# Patient Record
Sex: MALE | Race: BLACK OR AFRICAN AMERICAN | NOT HISPANIC OR LATINO
[De-identification: names, ages, dates, MRNs, and addresses within clinical notes are randomized per-mention and may not be internally consistent; named-entity substitution may affect disease eponyms.]

---

## 2017-03-30 ENCOUNTER — APPOINTMENT (OUTPATIENT)
Dept: INTERNAL MEDICINE | Facility: CLINIC | Age: 52
End: 2017-03-30

## 2017-03-30 VITALS
BODY MASS INDEX: 29.31 KG/M2 | OXYGEN SATURATION: 99 % | DIASTOLIC BLOOD PRESSURE: 78 MMHG | SYSTOLIC BLOOD PRESSURE: 132 MMHG | WEIGHT: 214 LBS | HEART RATE: 72 BPM | TEMPERATURE: 97.9 F | HEIGHT: 71.5 IN

## 2017-03-30 DIAGNOSIS — Z82.49 FAMILY HISTORY OF ISCHEMIC HEART DISEASE AND OTHER DISEASES OF THE CIRCULATORY SYSTEM: ICD-10-CM

## 2017-03-30 DIAGNOSIS — F15.90 OTHER STIMULANT USE, UNSPECIFIED, UNCOMPLICATED: ICD-10-CM

## 2017-03-31 ENCOUNTER — MED ADMIN CHARGE (OUTPATIENT)
Age: 52
End: 2017-03-31

## 2017-04-03 LAB
25(OH)D3 SERPL-MCNC: 15.8 NG/ML
ALBUMIN SERPL ELPH-MCNC: 4.5 G/DL
ALP BLD-CCNC: 67 U/L
ALT SERPL-CCNC: 25 U/L
ANION GAP SERPL CALC-SCNC: 16 MMOL/L
AST SERPL-CCNC: 24 U/L
BASOPHILS # BLD AUTO: 0.01 K/UL
BASOPHILS NFR BLD AUTO: 0.2 %
BILIRUB SERPL-MCNC: 0.9 MG/DL
BUN SERPL-MCNC: 14 MG/DL
CALCIUM SERPL-MCNC: 9.3 MG/DL
CHLORIDE SERPL-SCNC: 101 MMOL/L
CHOLEST SERPL-MCNC: 215 MG/DL
CHOLEST/HDLC SERPL: 5.4 RATIO
CO2 SERPL-SCNC: 26 MMOL/L
CREAT SERPL-MCNC: 1.14 MG/DL
EOSINOPHIL # BLD AUTO: 0.13 K/UL
EOSINOPHIL NFR BLD AUTO: 2.9 %
GLUCOSE SERPL-MCNC: 78 MG/DL
HBA1C MFR BLD HPLC: 5.6 %
HCT VFR BLD CALC: 43.4 %
HDLC SERPL-MCNC: 40 MG/DL
HGB BLD-MCNC: 14.1 G/DL
IMM GRANULOCYTES NFR BLD AUTO: 0.2 %
LDLC SERPL CALC-MCNC: 148 MG/DL
LYMPHOCYTES # BLD AUTO: 1.17 K/UL
LYMPHOCYTES NFR BLD AUTO: 26.3 %
MAN DIFF?: NORMAL
MCHC RBC-ENTMCNC: 28.8 PG
MCHC RBC-ENTMCNC: 32.5 GM/DL
MCV RBC AUTO: 88.6 FL
MONOCYTES # BLD AUTO: 0.56 K/UL
MONOCYTES NFR BLD AUTO: 12.6 %
NEUTROPHILS # BLD AUTO: 2.57 K/UL
NEUTROPHILS NFR BLD AUTO: 57.8 %
PLATELET # BLD AUTO: 155 K/UL
POTASSIUM SERPL-SCNC: 4.4 MMOL/L
PROT SERPL-MCNC: 7.3 G/DL
PSA FREE SERPL-MCNC: 0.22 NG/ML
RBC # BLD: 4.9 M/UL
RBC # FLD: 13.8 %
SODIUM SERPL-SCNC: 143 MMOL/L
TRIGL SERPL-MCNC: 136 MG/DL
TSH SERPL-ACNC: 2.41 UIU/ML
WBC # FLD AUTO: 4.45 K/UL

## 2018-01-19 ENCOUNTER — APPOINTMENT (OUTPATIENT)
Dept: HEART AND VASCULAR | Facility: CLINIC | Age: 53
End: 2018-01-19
Payer: COMMERCIAL

## 2018-01-19 VITALS
RESPIRATION RATE: 12 BRPM | HEIGHT: 74 IN | BODY MASS INDEX: 25.54 KG/M2 | SYSTOLIC BLOOD PRESSURE: 118 MMHG | HEART RATE: 70 BPM | WEIGHT: 199 LBS | DIASTOLIC BLOOD PRESSURE: 80 MMHG | TEMPERATURE: 97.8 F | OXYGEN SATURATION: 96 %

## 2018-01-19 PROCEDURE — 99386 PREV VISIT NEW AGE 40-64: CPT | Mod: 25

## 2018-01-19 PROCEDURE — 36415 COLL VENOUS BLD VENIPUNCTURE: CPT

## 2018-01-19 PROCEDURE — 93000 ELECTROCARDIOGRAM COMPLETE: CPT

## 2018-01-19 RX ORDER — MULTIVIT-MIN/IRON FUM/FOLIC AC 7.5 MG-4
TABLET ORAL
Refills: 0 | Status: DISCONTINUED | COMMUNITY
End: 2018-01-19

## 2018-01-19 RX ORDER — NAPROXEN 500 MG/1
500 TABLET ORAL
Qty: 10 | Refills: 0 | Status: DISCONTINUED | COMMUNITY
Start: 2017-11-06 | End: 2018-01-19

## 2018-01-19 RX ORDER — MELOXICAM 15 MG/1
15 TABLET ORAL
Qty: 30 | Refills: 0 | Status: DISCONTINUED | COMMUNITY
Start: 2017-11-21 | End: 2018-01-19

## 2018-01-19 RX ORDER — ATOVAQUONE AND PROGUANIL HYDROCHLORIDE 250; 100 MG/1; MG/1
250-100 TABLET, FILM COATED ORAL
Qty: 30 | Refills: 0 | Status: DISCONTINUED | COMMUNITY
Start: 2017-07-26 | End: 2018-01-19

## 2018-01-22 LAB
ALBUMIN SERPL ELPH-MCNC: 4.6 G/DL
ALP BLD-CCNC: 51 U/L
ALT SERPL-CCNC: 16 U/L
ANION GAP SERPL CALC-SCNC: 10 MMOL/L
APPEARANCE: CLEAR
AST SERPL-CCNC: 26 U/L
BACTERIA: NEGATIVE
BASOPHILS # BLD AUTO: 0.01 K/UL
BASOPHILS NFR BLD AUTO: 0.2 %
BILIRUB SERPL-MCNC: 1 MG/DL
BILIRUBIN URINE: NEGATIVE
BLOOD URINE: NEGATIVE
BUN SERPL-MCNC: 10 MG/DL
CALCIUM SERPL-MCNC: 9.4 MG/DL
CHLORIDE SERPL-SCNC: 104 MMOL/L
CHOLEST SERPL-MCNC: 191 MG/DL
CHOLEST/HDLC SERPL: 4.3 RATIO
CO2 SERPL-SCNC: 31 MMOL/L
COLOR: YELLOW
CREAT SERPL-MCNC: 1.23 MG/DL
EOSINOPHIL # BLD AUTO: 0.19 K/UL
EOSINOPHIL NFR BLD AUTO: 4 %
GLUCOSE QUALITATIVE U: NEGATIVE MG/DL
GLUCOSE SERPL-MCNC: 86 MG/DL
HBA1C MFR BLD HPLC: 5.4 %
HCT VFR BLD CALC: 43 %
HDLC SERPL-MCNC: 44 MG/DL
HGB BLD-MCNC: 14.2 G/DL
HYALINE CASTS: 0 /LPF
IMM GRANULOCYTES NFR BLD AUTO: 0 %
KETONES URINE: NEGATIVE
LDLC SERPL CALC-MCNC: 127 MG/DL
LEUKOCYTE ESTERASE URINE: NEGATIVE
LYMPHOCYTES # BLD AUTO: 1.53 K/UL
LYMPHOCYTES NFR BLD AUTO: 32.1 %
MAN DIFF?: NORMAL
MCHC RBC-ENTMCNC: 29.2 PG
MCHC RBC-ENTMCNC: 33 GM/DL
MCV RBC AUTO: 88.3 FL
MICROSCOPIC-UA: NORMAL
MONOCYTES # BLD AUTO: 0.59 K/UL
MONOCYTES NFR BLD AUTO: 12.4 %
NEUTROPHILS # BLD AUTO: 2.44 K/UL
NEUTROPHILS NFR BLD AUTO: 51.3 %
NITRITE URINE: NEGATIVE
PH URINE: 7.5
PLATELET # BLD AUTO: 175 K/UL
POTASSIUM SERPL-SCNC: 5.2 MMOL/L
PROT SERPL-MCNC: 7.3 G/DL
PROTEIN URINE: NEGATIVE MG/DL
PSA SERPL-MCNC: 0.34 NG/ML
RBC # BLD: 4.87 M/UL
RBC # FLD: 13.5 %
RED BLOOD CELLS URINE: 2 /HPF
SODIUM SERPL-SCNC: 145 MMOL/L
SPECIFIC GRAVITY URINE: 1.01
SQUAMOUS EPITHELIAL CELLS: 0 /HPF
TRIGL SERPL-MCNC: 100 MG/DL
TSH SERPL-ACNC: 1.56 UIU/ML
UROBILINOGEN URINE: NEGATIVE MG/DL
WBC # FLD AUTO: 4.76 K/UL
WHITE BLOOD CELLS URINE: 0 /HPF

## 2018-11-21 ENCOUNTER — OUTPATIENT (OUTPATIENT)
Dept: OUTPATIENT SERVICES | Facility: HOSPITAL | Age: 53
LOS: 1 days | End: 2018-11-21
Payer: COMMERCIAL

## 2018-11-21 DIAGNOSIS — Z98.89 OTHER SPECIFIED POSTPROCEDURAL STATES: Chronic | ICD-10-CM

## 2018-11-21 PROCEDURE — 73521 X-RAY EXAM HIPS BI 2 VIEWS: CPT

## 2018-11-21 PROCEDURE — 73630 X-RAY EXAM OF FOOT: CPT | Mod: 26,LT

## 2018-11-21 PROCEDURE — 73630 X-RAY EXAM OF FOOT: CPT

## 2018-11-21 PROCEDURE — 73521 X-RAY EXAM HIPS BI 2 VIEWS: CPT | Mod: 26

## 2019-03-14 ENCOUNTER — NON-APPOINTMENT (OUTPATIENT)
Age: 54
End: 2019-03-14

## 2019-03-14 ENCOUNTER — APPOINTMENT (OUTPATIENT)
Dept: HEART AND VASCULAR | Facility: CLINIC | Age: 54
End: 2019-03-14
Payer: COMMERCIAL

## 2019-03-14 VITALS — HEIGHT: 74 IN | WEIGHT: 198 LBS | BODY MASS INDEX: 25.41 KG/M2

## 2019-03-14 PROCEDURE — 99214 OFFICE O/P EST MOD 30 MIN: CPT

## 2019-03-14 PROCEDURE — 93000 ELECTROCARDIOGRAM COMPLETE: CPT

## 2019-03-14 PROCEDURE — 99204 OFFICE O/P NEW MOD 45 MIN: CPT

## 2019-03-15 VITALS — DIASTOLIC BLOOD PRESSURE: 84 MMHG | SYSTOLIC BLOOD PRESSURE: 126 MMHG | RESPIRATION RATE: 16 BRPM | HEART RATE: 72 BPM

## 2019-03-15 NOTE — PHYSICAL EXAM
[FreeTextEntry1] : HEENT: normal. No thyromegaly or lymphadenopathy. Neck exam reveals no JVD or bruits.  Carotid pulses are present and symetric.  Heart exam: S1, S2 regular without murmur, gallop, or rub.  The lungs are clear without evidence of wheezes, rhonchi or rales.  Abdomen: Positive bowel sounds, no rebound tenderness. Extremities reveal no clubbing, cyanosis or edema. Neurologic: Grossly nonfocal.

## 2019-03-15 NOTE — DISCUSSION/SUMMARY
[FreeTextEntry1] : EKG: Normal sinus rhythm, normal EKG. \par \par Impression: Atypical chest pain. No further cardiac workup or followup indicated at this time.

## 2019-03-15 NOTE — REASON FOR VISIT
[Initial Evaluation] : an initial evaluation of [Chest Pain] : chest pain [FreeTextEntry1] : This 53-year-old athletic male comes in complaining of sharp chest pain. He states his chest pain lasts for seconds only and is not associated with exertion. He works out in the gym on a daily basis without any symptoms. He denies chest shortness of breath, palpitations or syncope. He does lift heavy weights.

## 2019-10-02 ENCOUNTER — APPOINTMENT (OUTPATIENT)
Dept: ORTHOPEDIC SURGERY | Facility: CLINIC | Age: 54
End: 2019-10-02
Payer: COMMERCIAL

## 2019-10-02 VITALS — DIASTOLIC BLOOD PRESSURE: 100 MMHG | SYSTOLIC BLOOD PRESSURE: 138 MMHG

## 2019-10-02 VITALS — HEIGHT: 74 IN | BODY MASS INDEX: 26.18 KG/M2 | WEIGHT: 204 LBS

## 2019-10-02 PROCEDURE — 99203 OFFICE O/P NEW LOW 30 MIN: CPT

## 2019-10-02 NOTE — ASSESSMENT
[FreeTextEntry1] : 54 year old male with left hallux valgus.  We discussed treatment options with him.  He is very eager to be evaluated for surgical intervention to correct his deformity and symptoms. We provided him with a referral for orthotics. We also provided him with a referral for surgical consultation by a foot and ankle surgeon.  He knows he can call with any questions or concerns.

## 2019-10-02 NOTE — HISTORY OF PRESENT ILLNESS
[de-identified] : 54 year old male presents with complaints of a left foot hallux valgus.  He says it has been symptomatic for some time.  He wears dress shoes with a wide toe but it continues to be painful.  He also says the deformity has progressed over time.  He wants to discuss treatment options.

## 2019-10-02 NOTE — PHYSICAL EXAM
[de-identified] : Left Lower Extremity:\par visible hallux valgus deformity\par loss of the medial arch of the foot\par able to do heel rise\par tender to palpation over bunion. \par full extension and flexion at 1st MTP\par SILT medial/lateral/1st DWS\par +motor ankle dorsiflexion, plantarflexion, EHL and flexion and extension of toes\par WWP distally, palpable PT and DP pulses.   [de-identified] : Left foot radiographs show hallux valgus deformity

## 2021-03-29 ENCOUNTER — APPOINTMENT (OUTPATIENT)
Age: 56
End: 2021-03-29

## 2021-06-07 ENCOUNTER — TRANSCRIPTION ENCOUNTER (OUTPATIENT)
Age: 56
End: 2021-06-07

## 2021-06-08 ENCOUNTER — OUTPATIENT (OUTPATIENT)
Dept: OUTPATIENT SERVICES | Facility: HOSPITAL | Age: 56
LOS: 1 days | Discharge: HOME | End: 2021-06-08
Payer: COMMERCIAL

## 2021-06-08 ENCOUNTER — RESULT REVIEW (OUTPATIENT)
Age: 56
End: 2021-06-08

## 2021-06-08 DIAGNOSIS — Z98.89 OTHER SPECIFIED POSTPROCEDURAL STATES: Chronic | ICD-10-CM

## 2021-06-08 LAB — SARS-COV-2 RNA SPEC QL NAA+PROBE: NEGATIVE — SIGNIFICANT CHANGE UP

## 2021-06-08 PROCEDURE — 88305 TISSUE EXAM BY PATHOLOGIST: CPT | Mod: 26

## 2021-06-08 PROCEDURE — 88304 TISSUE EXAM BY PATHOLOGIST: CPT | Mod: 26

## 2022-04-29 ENCOUNTER — NON-APPOINTMENT (OUTPATIENT)
Age: 57
End: 2022-04-29

## 2022-04-29 ENCOUNTER — APPOINTMENT (OUTPATIENT)
Dept: UROLOGY | Facility: CLINIC | Age: 57
End: 2022-04-29
Payer: SELF-PAY

## 2022-04-29 ENCOUNTER — RESULT CHARGE (OUTPATIENT)
Age: 57
End: 2022-04-29

## 2022-04-29 DIAGNOSIS — K64.4 RESIDUAL HEMORRHOIDAL SKIN TAGS: ICD-10-CM

## 2022-04-29 DIAGNOSIS — M20.12 HALLUX VALGUS (ACQUIRED), LEFT FOOT: ICD-10-CM

## 2022-04-29 PROCEDURE — 99204 OFFICE O/P NEW MOD 45 MIN: CPT

## 2022-04-29 PROCEDURE — 51798 US URINE CAPACITY MEASURE: CPT

## 2022-04-29 NOTE — ADDENDUM
[FreeTextEntry1] : A portion of this note was written by [Norman Jacobo] on 04/29/2022 acting as a scribe for Dr. Us. \par \par I have personally reviewed the chart and agree that the record accurately reflects my personal performance of the history, physical exam, assessment, and plan.

## 2022-04-29 NOTE — HISTORY OF PRESENT ILLNESS
[FreeTextEntry1] : Mr. JASWANT CABEZAS comes in today for a urologic evaluation. He is here at the request of his wife for a routine evaluation. \par \par From his general urologic history, Mr. CABEZAS reports mild stable lower urinary tract symptoms, with nocturia x 1. \par IPSS: 5/35\par Sono (performed to assess bladder emptying): Nil PVR; 32cc prostate\par \par His erectile function is reportedly normal. \par \par PSAs: 1/22/18--0.34;

## 2022-04-29 NOTE — PHYSICAL EXAM
[General Appearance - Well Developed] : well developed [General Appearance - Well Nourished] : well nourished [Normal Appearance] : normal appearance [Well Groomed] : well groomed [General Appearance - In No Acute Distress] : no acute distress [Edema] : no peripheral edema [Respiration, Rhythm And Depth] : normal respiratory rhythm and effort [Exaggerated Use Of Accessory Muscles For Inspiration] : no accessory muscle use [Abdomen Soft] : soft [Abdomen Tenderness] : non-tender [Costovertebral Angle Tenderness] : no ~M costovertebral angle tenderness [Urethral Meatus] : meatus normal [Urinary Bladder Findings] : the bladder was normal on palpation [Scrotum] : the scrotum was normal [Testes Mass (___cm)] : there were no testicular masses [No Prostate Nodules] : no prostate nodules [Normal Station and Gait] : the gait and station were normal for the patient's age [] : no rash [No Focal Deficits] : no focal deficits [Oriented To Time, Place, And Person] : oriented to person, place, and time [Affect] : the affect was normal [Mood] : the mood was normal [Not Anxious] : not anxious [No Palpable Adenopathy] : no palpable adenopathy [Heart Rate And Rhythm] : Heart rate and rhythm were normal [Abdomen Mass (___ Cm)] : no abdominal mass palpated [Penis Abnormality] : normal circumcised penis [Epididymis] : the epididymides were normal [Testes Tenderness] : no tenderness of the testes [Prostate Tenderness] : the prostate was not tender [Skin Color & Pigmentation] : normal skin color and pigmentation [FreeTextEntry1] : Umbilical hernia (reducible)

## 2022-04-29 NOTE — LETTER BODY
[Dear  ___] : Dear  [unfilled], [Consult Letter:] : I had the pleasure of evaluating your patient, [unfilled]. [Please see my note below.] : Please see my note below. [Consult Closing:] : Thank you very much for allowing me to participate in the care of this patient.  If you have any questions, please do not hesitate to contact me. [Sincerely,] : Sincerely, [FreeTextEntry3] : Cedrick Us MD, FACS

## 2022-04-30 LAB
BILIRUB UR QL STRIP: NORMAL
CLARITY UR: CLEAR
COLLECTION METHOD: NORMAL
GLUCOSE UR-MCNC: NORMAL
HCG UR QL: 0.2 EU/DL
HGB UR QL STRIP.AUTO: NORMAL
KETONES UR-MCNC: NORMAL
LEUKOCYTE ESTERASE UR QL STRIP: NORMAL
NITRITE UR QL STRIP: NORMAL
PH UR STRIP: 7
PROT UR STRIP-MCNC: NORMAL
PSA SERPL-MCNC: 0.35 NG/ML
SP GR UR STRIP: 1.01

## 2022-05-02 ENCOUNTER — NON-APPOINTMENT (OUTPATIENT)
Age: 57
End: 2022-05-02

## 2022-05-16 ENCOUNTER — NON-APPOINTMENT (OUTPATIENT)
Age: 57
End: 2022-05-16

## 2022-05-16 ENCOUNTER — APPOINTMENT (OUTPATIENT)
Dept: INTERNAL MEDICINE | Facility: CLINIC | Age: 57
End: 2022-05-16
Payer: COMMERCIAL

## 2022-05-16 VITALS
OXYGEN SATURATION: 99 % | TEMPERATURE: 97.1 F | HEART RATE: 74 BPM | SYSTOLIC BLOOD PRESSURE: 138 MMHG | BODY MASS INDEX: 26.18 KG/M2 | WEIGHT: 204 LBS | DIASTOLIC BLOOD PRESSURE: 88 MMHG | HEIGHT: 74 IN

## 2022-05-16 DIAGNOSIS — Z87.898 PERSONAL HISTORY OF OTHER SPECIFIED CONDITIONS: ICD-10-CM

## 2022-05-16 DIAGNOSIS — Z00.00 ENCOUNTER FOR GENERAL ADULT MEDICAL EXAMINATION W/OUT ABNORMAL FINDINGS: ICD-10-CM

## 2022-05-16 DIAGNOSIS — R39.9 UNSPECIFIED SYMPTOMS AND SIGNS INVOLVING THE GENITOURINARY SYSTEM: ICD-10-CM

## 2022-05-16 PROCEDURE — 99386 PREV VISIT NEW AGE 40-64: CPT | Mod: 25

## 2022-05-16 PROCEDURE — 93000 ELECTROCARDIOGRAM COMPLETE: CPT

## 2022-05-16 PROCEDURE — 36415 COLL VENOUS BLD VENIPUNCTURE: CPT

## 2022-05-16 NOTE — HISTORY OF PRESENT ILLNESS
[de-identified] : No nausea, vomiting, diarrhea, and constipation. No chest pain or shortness of breath.\par Push ups,sit ups, stationary bike, walks.  Daily exercise.  Weight stable.\par \par Neck discomfort with extension with occasional HA.  No h/o trauma

## 2022-05-16 NOTE — HEALTH RISK ASSESSMENT
[Very Good] : ~his/her~  mood as very good [Never] : Never [0] : 2) Feeling down, depressed, or hopeless: Not at all (0) [Patient reported colonoscopy was normal] : Patient reported colonoscopy was normal [None] : None [With Family] : lives with family [Employed] : employed [] :  [# Of Children ___] : has [unfilled] children [[id number]] : 0 [ColonoscopyDate] : 03/22

## 2022-05-27 LAB
ALBUMIN SERPL ELPH-MCNC: 4.6 G/DL
ALP BLD-CCNC: 56 U/L
ALT SERPL-CCNC: 25 U/L
ANION GAP SERPL CALC-SCNC: 8 MMOL/L
APPEARANCE: CLEAR
AST SERPL-CCNC: 53 U/L
BASOPHILS # BLD AUTO: 0.02 K/UL
BASOPHILS NFR BLD AUTO: 0.5 %
BILIRUB SERPL-MCNC: 0.8 MG/DL
BILIRUBIN URINE: NEGATIVE
BLOOD URINE: NEGATIVE
BUN SERPL-MCNC: 7 MG/DL
CALCIUM SERPL-MCNC: 9.3 MG/DL
CHLORIDE SERPL-SCNC: 103 MMOL/L
CHOLEST SERPL-MCNC: 208 MG/DL
CO2 SERPL-SCNC: 30 MMOL/L
COLOR: COLORLESS
CREAT SERPL-MCNC: 1.24 MG/DL
EGFR: 69 ML/MIN/1.73M2
EOSINOPHIL # BLD AUTO: 0.19 K/UL
EOSINOPHIL NFR BLD AUTO: 4.4 %
FOLATE SERPL-MCNC: 15.9 NG/ML
GLUCOSE QUALITATIVE U: NEGATIVE
GLUCOSE SERPL-MCNC: 90 MG/DL
HCT VFR BLD CALC: 41.9 %
HDLC SERPL-MCNC: 48 MG/DL
HGB BLD-MCNC: 13.8 G/DL
IMM GRANULOCYTES NFR BLD AUTO: 0.2 %
KETONES URINE: NEGATIVE
LDLC SERPL CALC-MCNC: 135 MG/DL
LEUKOCYTE ESTERASE URINE: NEGATIVE
LYMPHOCYTES # BLD AUTO: 1.54 K/UL
LYMPHOCYTES NFR BLD AUTO: 35.7 %
MAN DIFF?: NORMAL
MCHC RBC-ENTMCNC: 29.2 PG
MCHC RBC-ENTMCNC: 32.9 GM/DL
MCV RBC AUTO: 88.6 FL
MONOCYTES # BLD AUTO: 0.53 K/UL
MONOCYTES NFR BLD AUTO: 12.3 %
NEUTROPHILS # BLD AUTO: 2.02 K/UL
NEUTROPHILS NFR BLD AUTO: 46.9 %
NITRITE URINE: NEGATIVE
NONHDLC SERPL-MCNC: 160 MG/DL
PH URINE: 7
PLATELET # BLD AUTO: 149 K/UL
POTASSIUM SERPL-SCNC: 4.2 MMOL/L
PROT SERPL-MCNC: 6.8 G/DL
PROTEIN URINE: NEGATIVE
RBC # BLD: 4.73 M/UL
RBC # FLD: 13.1 %
SODIUM SERPL-SCNC: 141 MMOL/L
SPECIFIC GRAVITY URINE: 1
TRIGL SERPL-MCNC: 128 MG/DL
TSH SERPL-ACNC: 3.77 UIU/ML
UROBILINOGEN URINE: NORMAL
VIT B12 SERPL-MCNC: 232 PG/ML
WBC # FLD AUTO: 4.31 K/UL

## 2022-06-01 ENCOUNTER — APPOINTMENT (OUTPATIENT)
Dept: INTERNAL MEDICINE | Facility: CLINIC | Age: 57
End: 2022-06-01
Payer: COMMERCIAL

## 2022-06-01 ENCOUNTER — MED ADMIN CHARGE (OUTPATIENT)
Age: 57
End: 2022-06-01

## 2022-06-01 PROCEDURE — 96372 THER/PROPH/DIAG INJ SC/IM: CPT

## 2022-06-01 RX ORDER — CYANOCOBALAMIN 1000 UG/ML
1000 INJECTION INTRAMUSCULAR; SUBCUTANEOUS
Qty: 0 | Refills: 0 | Status: COMPLETED | OUTPATIENT
Start: 2022-06-01

## 2022-06-15 LAB — SURGICAL PATHOLOGY STUDY: SIGNIFICANT CHANGE UP

## 2022-07-06 ENCOUNTER — APPOINTMENT (OUTPATIENT)
Dept: INTERNAL MEDICINE | Facility: CLINIC | Age: 57
End: 2022-07-06

## 2022-07-06 VITALS
HEART RATE: 67 BPM | HEIGHT: 74 IN | DIASTOLIC BLOOD PRESSURE: 80 MMHG | BODY MASS INDEX: 25.54 KG/M2 | RESPIRATION RATE: 16 BRPM | WEIGHT: 199 LBS | OXYGEN SATURATION: 98 % | SYSTOLIC BLOOD PRESSURE: 134 MMHG

## 2022-07-06 PROCEDURE — 99214 OFFICE O/P EST MOD 30 MIN: CPT | Mod: 25

## 2022-07-06 PROCEDURE — 96372 THER/PROPH/DIAG INJ SC/IM: CPT

## 2022-07-06 RX ORDER — CYANOCOBALAMIN 1000 UG/ML
1000 INJECTION INTRAMUSCULAR; SUBCUTANEOUS
Qty: 0 | Refills: 0 | Status: COMPLETED | OUTPATIENT
Start: 2022-07-06

## 2022-07-06 RX ADMIN — CYANOCOBALAMIN 0 MCG/ML: 1000 INJECTION INTRAMUSCULAR; SUBCUTANEOUS at 00:00

## 2022-07-08 ENCOUNTER — RESULT REVIEW (OUTPATIENT)
Age: 57
End: 2022-07-08

## 2022-07-08 ENCOUNTER — OUTPATIENT (OUTPATIENT)
Dept: OUTPATIENT SERVICES | Facility: HOSPITAL | Age: 57
LOS: 1 days | End: 2022-07-08

## 2022-07-08 ENCOUNTER — APPOINTMENT (OUTPATIENT)
Dept: MRI IMAGING | Facility: CLINIC | Age: 57
End: 2022-07-08

## 2022-07-08 DIAGNOSIS — Z98.89 OTHER SPECIFIED POSTPROCEDURAL STATES: Chronic | ICD-10-CM

## 2022-07-08 PROCEDURE — 72141 MRI NECK SPINE W/O DYE: CPT | Mod: 26

## 2022-07-08 NOTE — HISTORY OF PRESENT ILLNESS
[FreeTextEntry1] : Neck pain. [de-identified] : c/o Chronic neck pain R side of neck.  Pt attributes to an allergy to a hair product months back activating nerves and also using three monitors at work with constant turning of head.  Gives a painful headache relieved with Excedrin.  No hx MVA.  No hx trauma.

## 2022-07-08 NOTE — PHYSICAL EXAM
[No Acute Distress] : no acute distress [Well Nourished] : well nourished [Normal Oropharynx] : the oropharynx was normal [Normal TMs] : both tympanic membranes were normal [No JVD] : no jugular venous distention [Supple] : supple [No Respiratory Distress] : no respiratory distress  [Clear to Auscultation] : lungs were clear to auscultation bilaterally [Regular Rhythm] : with a regular rhythm [No Murmur] : no murmur heard [Soft] : abdomen soft [Non Tender] : non-tender [No Rash] : no rash [Coordination Grossly Intact] : coordination grossly intact [Normal Gait] : normal gait [de-identified] : Patient has right-sided cervical muscle spasms with tenderness from the occiput down to the trapezius

## 2022-07-22 LAB
ALBUMIN SERPL ELPH-MCNC: 4.5 G/DL
ALP BLD-CCNC: 51 U/L
ALT SERPL-CCNC: 28 U/L
ANION GAP SERPL CALC-SCNC: 7 MMOL/L
AST SERPL-CCNC: 38 U/L
BILIRUB SERPL-MCNC: 0.5 MG/DL
BUN SERPL-MCNC: 11 MG/DL
CALCIUM SERPL-MCNC: 9.1 MG/DL
CHLORIDE SERPL-SCNC: 101 MMOL/L
CK SERPL-CCNC: 336 U/L
CO2 SERPL-SCNC: 30 MMOL/L
CREAT SERPL-MCNC: 1.39 MG/DL
EGFR: 60 ML/MIN/1.73M2
GGT SERPL-CCNC: 11 U/L
GLUCOSE SERPL-MCNC: 84 MG/DL
HAV IGM SER QL: NONREACTIVE
HBV CORE IGM SER QL: NONREACTIVE
HBV SURFACE AG SER QL: NONREACTIVE
HCV AB SER QL: NONREACTIVE
HCV S/CO RATIO: 0.14 S/CO
MAGNESIUM SERPL-MCNC: 2.2 MG/DL
PHOSPHATE SERPL-MCNC: 3.5 MG/DL
POTASSIUM SERPL-SCNC: 4.5 MMOL/L
PROT SERPL-MCNC: 6.8 G/DL
SODIUM SERPL-SCNC: 138 MMOL/L

## 2022-08-02 ENCOUNTER — APPOINTMENT (OUTPATIENT)
Dept: INTERNAL MEDICINE | Facility: CLINIC | Age: 57
End: 2022-08-02

## 2022-08-09 ENCOUNTER — APPOINTMENT (OUTPATIENT)
Dept: ORTHOPEDIC SURGERY | Facility: CLINIC | Age: 57
End: 2022-08-09

## 2022-08-09 PROCEDURE — 72050 X-RAY EXAM NECK SPINE 4/5VWS: CPT

## 2022-08-09 PROCEDURE — 99214 OFFICE O/P EST MOD 30 MIN: CPT

## 2022-08-11 NOTE — HISTORY OF PRESENT ILLNESS
[de-identified] : Initial 8/9/22: Mr. Madden reports a 1-2 year history of neck clicking sound and sensation when he turns his head. Patient states onset while working remotely due to COVID. The symptoms have progressed over the past year. He also reports nonradiating neck pain locally to the right side of his neck. He denies upper extremity numbness, weakness, or paresthesias. He does report paresthesias that radiate up the back of his head, worse on the right side, and occasionally radiate forward to his forehead. He has had 3 sessions of physical therapy over the past several weeks with mild improvement of his neck pain.

## 2022-08-11 NOTE — PHYSICAL EXAM
[de-identified] : General: patient is well developed, well nourished, in no acute \par distress, alert and oriented x 3. \par \par Mood and affect: normal\par \par Respiratory: no respiratory distress noted\par \par Skin: no scars over spine, skin intact, no erythema, increased warmth\par \par Alignment:The spine is well compensated in the coronal and sagittal plane. \par \par Gait: The patient is able to toe walk and heel walk without difficulty. \par \par Palpation: no tenderness to palpation cervical spine or paraspinal region\par \par Range of motion: Cervical spine ROM is full\par \par Neurologic Exam:\par Motor: Manual Muscle testing in the upper and lower extremities is 5 out of 5 in all muscle groups. There is no evidence of muscular atrophy in the upper extremities. Sensory: Sensation to light touch is grossly intact in the upper and lower extremities\par \par Reflexes: DTR are present and symmetric throughout, negative whitaker bilaterally, negative inverted radial reflex bilaterally, no clonus, plantar responses are flexor\par \par Special tests: Spurlings sign negative Lhermitte's sign is absent. .\par \par Vascular: Examination of the peripheral vascular system demonstrates no evidence of congestion or edema. no lymphedema bilateral lower extremities, pulses are present and symmetric in both lower extremities.  [de-identified] : XR AP/Lateral, flexion/extension 8/9/22 [my read]: demonstrate C4/5 mild disc degeneration with anterior osteophyte C5/6 and C6/7 with severe disc degeneration and height loss C7/T1 with anterior heterotopic ossification of the disc space. Flexion/extension does not reveal any instability, and there are no fractures seen. \par \par MRI Cervical Spine 7/8/22 [my read]: demonstrates again C4-T1 disc degeneration worse at C5/6 and C6/7. There is foraminal stenosis seen at C5/6 and C6/7. There is no significant neurocompression at the other levels. There is no cord compression. There is no signal change.

## 2022-08-11 NOTE — ADDENDUM
[FreeTextEntry1] : I, Louisa Vance, assisted with documentation on 08/09/2022 acting as scribe for Dr. Norman Vieyra.

## 2022-08-11 NOTE — DISCUSSION/SUMMARY
[de-identified] : Diagnosis: cervical degenerative disc disease multilevel, cervical foraminal stenosis C5-7, possible cervicogenic headaches/paresthesia. \par \par I have discussed my findings with the patient. His symptoms are well managed from a structural perspective, and he does not require any surgery at this time. He is concerned about the paresthesias that he feels on both sides of his head, and I have recommended that he see a neurologist for this condition for further consultation. He should see me on an as needed basis.

## 2022-08-30 ENCOUNTER — APPOINTMENT (OUTPATIENT)
Dept: HEART AND VASCULAR | Facility: CLINIC | Age: 57
End: 2022-08-30

## 2022-08-30 ENCOUNTER — NON-APPOINTMENT (OUTPATIENT)
Age: 57
End: 2022-08-30

## 2022-08-30 VITALS
DIASTOLIC BLOOD PRESSURE: 84 MMHG | HEART RATE: 50 BPM | WEIGHT: 192 LBS | TEMPERATURE: 98.6 F | BODY MASS INDEX: 24.64 KG/M2 | HEIGHT: 74 IN | SYSTOLIC BLOOD PRESSURE: 146 MMHG

## 2022-08-30 PROCEDURE — 99204 OFFICE O/P NEW MOD 45 MIN: CPT | Mod: 25

## 2022-08-30 PROCEDURE — 93306 TTE W/DOPPLER COMPLETE: CPT

## 2022-08-30 PROCEDURE — 93000 ELECTROCARDIOGRAM COMPLETE: CPT

## 2022-08-30 RX ORDER — AMLODIPINE BESYLATE 5 MG/1
5 TABLET ORAL
Qty: 90 | Refills: 3 | Status: DISCONTINUED | COMMUNITY
Start: 2022-08-30 | End: 2022-08-30

## 2022-09-20 ENCOUNTER — APPOINTMENT (OUTPATIENT)
Dept: HEART AND VASCULAR | Facility: CLINIC | Age: 57
End: 2022-09-20

## 2022-09-20 VITALS
DIASTOLIC BLOOD PRESSURE: 90 MMHG | TEMPERATURE: 97.4 F | HEART RATE: 64 BPM | WEIGHT: 195 LBS | SYSTOLIC BLOOD PRESSURE: 142 MMHG | BODY MASS INDEX: 25.04 KG/M2

## 2022-09-20 PROCEDURE — 93015 CV STRESS TEST SUPVJ I&R: CPT

## 2022-09-20 PROCEDURE — 99214 OFFICE O/P EST MOD 30 MIN: CPT | Mod: 25

## 2022-09-26 ENCOUNTER — OUTPATIENT (OUTPATIENT)
Dept: OUTPATIENT SERVICES | Facility: HOSPITAL | Age: 57
LOS: 1 days | End: 2022-09-26
Payer: SELF-PAY

## 2022-09-26 ENCOUNTER — APPOINTMENT (OUTPATIENT)
Dept: CT IMAGING | Facility: HOSPITAL | Age: 57
End: 2022-09-26

## 2022-09-26 DIAGNOSIS — Z98.89 OTHER SPECIFIED POSTPROCEDURAL STATES: Chronic | ICD-10-CM

## 2022-09-26 PROCEDURE — 75571 CT HRT W/O DYE W/CA TEST: CPT

## 2022-09-26 PROCEDURE — 75571 CT HRT W/O DYE W/CA TEST: CPT | Mod: 26

## 2022-09-28 ENCOUNTER — APPOINTMENT (OUTPATIENT)
Dept: NEUROLOGY | Facility: CLINIC | Age: 57
End: 2022-09-28

## 2022-09-28 ENCOUNTER — LABORATORY RESULT (OUTPATIENT)
Age: 57
End: 2022-09-28

## 2022-09-28 ENCOUNTER — NON-APPOINTMENT (OUTPATIENT)
Age: 57
End: 2022-09-28

## 2022-09-28 VITALS — DIASTOLIC BLOOD PRESSURE: 78 MMHG | HEART RATE: 80 BPM | OXYGEN SATURATION: 99 % | SYSTOLIC BLOOD PRESSURE: 118 MMHG

## 2022-09-28 VITALS
HEIGHT: 74 IN | SYSTOLIC BLOOD PRESSURE: 153 MMHG | BODY MASS INDEX: 24.26 KG/M2 | HEART RATE: 60 BPM | TEMPERATURE: 97.8 F | OXYGEN SATURATION: 96 % | DIASTOLIC BLOOD PRESSURE: 89 MMHG | WEIGHT: 189 LBS

## 2022-09-28 DIAGNOSIS — M54.81 OCCIPITAL NEURALGIA: ICD-10-CM

## 2022-09-28 PROCEDURE — 99204 OFFICE O/P NEW MOD 45 MIN: CPT

## 2022-09-28 RX ORDER — ATENOLOL 50 MG/1
50 TABLET ORAL
Qty: 180 | Refills: 0 | Status: DISCONTINUED | COMMUNITY
Start: 2022-01-10 | End: 2022-09-28

## 2022-09-28 NOTE — PHYSICAL EXAM
[FreeTextEntry1] : Motor: 5/5 strength throughout\par Sensory: vibration mildly reduced at toes b/l\par Reflexes: 2+ symmetric throughout \par Gait: normal, can tandem without difficulty \par MSK: tenderness of right upper trapezius and insertion of semispinalis capitis

## 2022-09-28 NOTE — HISTORY OF PRESENT ILLNESS
[FreeTextEntry1] : Referred by Dr. Rosa for neck pain and tingling in the back of the head, right worse than left, started about 2 months ago and has gotten better since then. He has been doing physical therapy which has helped. He relates onset of symptoms to using a monitor up and to the right of his head; he changed the set up and thinks this has helped. He was also using a hair dye which caused some itching and thinks it might be related to the numbness. \par He also complains of imbalance, which has also improved. \par \par Reviewed: \par Labs-  B12 232, folate 15.9, \par \par Personally reviewed and interpreted:\par MRI C spine - multilevel degenerative disease worst at C/5/6/7 without cord or nerve root compression

## 2022-09-28 NOTE — ASSESSMENT
[FreeTextEntry1] : Symptoms consistent with neck muscle spasm with occipital neuralgia, now improved with changes to ergonomics and PT\par Imbalance may be related to B12 deficiency although currently balance is normal and vibration sense only very mildly reduced at toes\par Return as needed

## 2022-09-28 NOTE — CONSULT LETTER
[Dear  ___] : Dear  [unfilled], [Consult Letter:] : I had the pleasure of evaluating your patient, [unfilled]. [Please see my note below.] : Please see my note below. [Consult Closing:] : Thank you very much for allowing me to participate in the care of this patient.  If you have any questions, please do not hesitate to contact me. [Sincerely,] : Sincerely, [FreeTextEntry3] : Harsh Seymour M.D.\par Neurology, Electromyography and Neuromuscular Medicine\par [facility]\par \par  of Neurology\par [facility] [employer] [facility] [DrAutumn  ___] : Dr. OROZCO

## 2022-10-04 ENCOUNTER — APPOINTMENT (OUTPATIENT)
Dept: HEART AND VASCULAR | Facility: CLINIC | Age: 57
End: 2022-10-04

## 2022-10-04 PROCEDURE — 99213 OFFICE O/P EST LOW 20 MIN: CPT | Mod: 95

## 2022-11-10 ENCOUNTER — APPOINTMENT (OUTPATIENT)
Dept: INTERNAL MEDICINE | Facility: CLINIC | Age: 57
End: 2022-11-10

## 2022-11-10 ENCOUNTER — MED ADMIN CHARGE (OUTPATIENT)
Age: 57
End: 2022-11-10

## 2022-11-10 PROCEDURE — 96372 THER/PROPH/DIAG INJ SC/IM: CPT

## 2022-11-10 RX ORDER — CYANOCOBALAMIN 1000 UG/ML
1000 INJECTION INTRAMUSCULAR; SUBCUTANEOUS
Qty: 0 | Refills: 0 | Status: COMPLETED | OUTPATIENT
Start: 2022-11-08

## 2022-11-14 ENCOUNTER — APPOINTMENT (OUTPATIENT)
Dept: HEART AND VASCULAR | Facility: CLINIC | Age: 57
End: 2022-11-14

## 2022-11-14 ENCOUNTER — NON-APPOINTMENT (OUTPATIENT)
Age: 57
End: 2022-11-14

## 2022-11-14 VITALS
HEART RATE: 56 BPM | BODY MASS INDEX: 24.13 KG/M2 | WEIGHT: 188 LBS | HEIGHT: 74 IN | TEMPERATURE: 98.6 F | DIASTOLIC BLOOD PRESSURE: 90 MMHG | SYSTOLIC BLOOD PRESSURE: 148 MMHG

## 2022-11-14 PROCEDURE — 99213 OFFICE O/P EST LOW 20 MIN: CPT | Mod: 25

## 2022-11-14 PROCEDURE — 93000 ELECTROCARDIOGRAM COMPLETE: CPT

## 2022-11-14 PROCEDURE — 36415 COLL VENOUS BLD VENIPUNCTURE: CPT

## 2022-11-15 LAB
CHOLEST SERPL-MCNC: 196 MG/DL
CRP SERPL HS-MCNC: <0.15 MG/L
HCYS SERPL-MCNC: 12.1 UMOL/L
HDLC SERPL-MCNC: 54 MG/DL
LDLC SERPL CALC-MCNC: 126 MG/DL
NONHDLC SERPL-MCNC: 142 MG/DL
TRIGL SERPL-MCNC: 82 MG/DL

## 2022-11-16 ENCOUNTER — APPOINTMENT (OUTPATIENT)
Dept: HEART AND VASCULAR | Facility: CLINIC | Age: 57
End: 2022-11-16

## 2022-11-16 LAB — APO LP(A) SERPL-MCNC: 59.7 NMOL/L

## 2022-11-16 PROCEDURE — 99443: CPT

## 2022-12-08 ENCOUNTER — APPOINTMENT (OUTPATIENT)
Dept: INTERNAL MEDICINE | Facility: CLINIC | Age: 57
End: 2022-12-08

## 2022-12-08 ENCOUNTER — MED ADMIN CHARGE (OUTPATIENT)
Age: 57
End: 2022-12-08

## 2022-12-08 PROCEDURE — 96372 THER/PROPH/DIAG INJ SC/IM: CPT

## 2022-12-08 RX ORDER — CYANOCOBALAMIN 1000 UG/ML
1000 INJECTION INTRAMUSCULAR; SUBCUTANEOUS
Qty: 0 | Refills: 0 | Status: COMPLETED | OUTPATIENT
Start: 2022-12-08

## 2022-12-08 RX ADMIN — CYANOCOBALAMIN 0 MCG/ML: 1000 INJECTION INTRAMUSCULAR; SUBCUTANEOUS at 00:00

## 2023-01-10 ENCOUNTER — APPOINTMENT (OUTPATIENT)
Dept: INTERNAL MEDICINE | Facility: CLINIC | Age: 58
End: 2023-01-10
Payer: COMMERCIAL

## 2023-01-10 ENCOUNTER — MED ADMIN CHARGE (OUTPATIENT)
Age: 58
End: 2023-01-10

## 2023-01-10 PROCEDURE — 96372 THER/PROPH/DIAG INJ SC/IM: CPT

## 2023-01-10 RX ORDER — CYANOCOBALAMIN 1000 UG/ML
1000 INJECTION INTRAMUSCULAR; SUBCUTANEOUS
Qty: 0 | Refills: 0 | Status: COMPLETED | OUTPATIENT
Start: 2023-01-10

## 2023-01-19 ENCOUNTER — APPOINTMENT (OUTPATIENT)
Dept: INTERNAL MEDICINE | Facility: CLINIC | Age: 58
End: 2023-01-19
Payer: COMMERCIAL

## 2023-01-19 ENCOUNTER — NON-APPOINTMENT (OUTPATIENT)
Age: 58
End: 2023-01-19

## 2023-01-19 DIAGNOSIS — M54.50 LOW BACK PAIN, UNSPECIFIED: ICD-10-CM

## 2023-01-19 PROCEDURE — 99213 OFFICE O/P EST LOW 20 MIN: CPT | Mod: 95

## 2023-01-19 RX ORDER — CYCLOBENZAPRINE HYDROCHLORIDE 10 MG/1
10 TABLET, FILM COATED ORAL TWICE DAILY
Qty: 20 | Refills: 1 | Status: ACTIVE | COMMUNITY
Start: 2023-01-19 | End: 1900-01-01

## 2023-01-19 RX ORDER — NAPROXEN 500 MG/1
500 TABLET ORAL
Qty: 20 | Refills: 0 | Status: ACTIVE | COMMUNITY
Start: 2023-01-19 | End: 1900-01-01

## 2023-01-21 NOTE — HISTORY OF PRESENT ILLNESS
[Home] : at home, [unfilled] , at the time of the visit. [Medical Office: ([facility])___] : at the medical office located in  [Verbal consent obtained from patient] : the patient, [unfilled] [de-identified] : Ran into wall playing [employer] 2 days and has pain in R lower back.  Having spasms and very bad if coughs.  No advil or heat.  Using Salon patch.  No nausea, vomiting, diarrhea, and constipation. No chest pain or shortness of breath.\par

## 2023-01-21 NOTE — REVIEW OF SYSTEMS
[Fever] : no fever [Night Sweats] : no night sweats [Earache] : no earache [Nasal Discharge] : no nasal discharge [Chest Pain] : no chest pain [Palpitations] : no palpitations [Shortness Of Breath] : no shortness of breath [Wheezing] : no wheezing [Nausea] : no nausea [Vomiting] : no vomiting [Easy Bleeding] : no easy bleeding [Easy Bruising] : no easy bruising

## 2023-01-30 RX ORDER — CYANOCOBALAMIN 1000 UG/ML
1000 INJECTION INTRAMUSCULAR; SUBCUTANEOUS
Qty: 0 | Refills: 0 | Status: COMPLETED | OUTPATIENT
Start: 2022-12-08

## 2023-02-17 ENCOUNTER — APPOINTMENT (OUTPATIENT)
Dept: INTERNAL MEDICINE | Facility: CLINIC | Age: 58
End: 2023-02-17
Payer: COMMERCIAL

## 2023-02-17 ENCOUNTER — MED ADMIN CHARGE (OUTPATIENT)
Age: 58
End: 2023-02-17

## 2023-02-17 DIAGNOSIS — E53.8 DEFICIENCY OF OTHER SPECIFIED B GROUP VITAMINS: ICD-10-CM

## 2023-02-17 PROCEDURE — 96372 THER/PROPH/DIAG INJ SC/IM: CPT

## 2023-02-17 RX ORDER — CYANOCOBALAMIN 1000 UG/ML
1000 INJECTION INTRAMUSCULAR; SUBCUTANEOUS
Qty: 0 | Refills: 0 | Status: COMPLETED | OUTPATIENT
Start: 2023-02-17

## 2023-02-17 RX ADMIN — CYANOCOBALAMIN 0 MCG/ML: 1000 INJECTION, SOLUTION INTRAMUSCULAR at 00:00

## 2023-02-22 PROBLEM — E53.8 VITAMIN B12 DEFICIENCY: Status: ACTIVE | Noted: 2022-06-01

## 2023-02-28 DIAGNOSIS — M25.559 PAIN IN UNSPECIFIED HIP: ICD-10-CM

## 2023-06-12 ENCOUNTER — NON-APPOINTMENT (OUTPATIENT)
Age: 58
End: 2023-06-12

## 2023-06-12 ENCOUNTER — APPOINTMENT (OUTPATIENT)
Dept: HEART AND VASCULAR | Facility: CLINIC | Age: 58
End: 2023-06-12
Payer: COMMERCIAL

## 2023-06-12 VITALS
BODY MASS INDEX: 24.38 KG/M2 | DIASTOLIC BLOOD PRESSURE: 96 MMHG | WEIGHT: 190 LBS | TEMPERATURE: 98.7 F | SYSTOLIC BLOOD PRESSURE: 148 MMHG | HEART RATE: 64 BPM | OXYGEN SATURATION: 96 % | HEIGHT: 74 IN

## 2023-06-12 DIAGNOSIS — M54.2 CERVICALGIA: ICD-10-CM

## 2023-06-12 PROCEDURE — 99214 OFFICE O/P EST MOD 30 MIN: CPT | Mod: 25

## 2023-06-12 PROCEDURE — 36415 COLL VENOUS BLD VENIPUNCTURE: CPT

## 2023-06-12 PROCEDURE — 93000 ELECTROCARDIOGRAM COMPLETE: CPT

## 2023-06-13 LAB
24R-OH-CALCIDIOL SERPL-MCNC: 61.4 PG/ML
ALBUMIN SERPL ELPH-MCNC: 4.7 G/DL
ALP BLD-CCNC: 63 U/L
ALT SERPL-CCNC: 24 U/L
ANION GAP SERPL CALC-SCNC: 12 MMOL/L
AST SERPL-CCNC: 31 U/L
BILIRUB SERPL-MCNC: 1 MG/DL
BUN SERPL-MCNC: 12 MG/DL
CALCIUM SERPL-MCNC: 9.3 MG/DL
CHLORIDE SERPL-SCNC: 105 MMOL/L
CHOLEST SERPL-MCNC: 192 MG/DL
CO2 SERPL-SCNC: 27 MMOL/L
CREAT SERPL-MCNC: 1.26 MG/DL
EGFR: 67 ML/MIN/1.73M2
ESTIMATED AVERAGE GLUCOSE: 108 MG/DL
GLUCOSE SERPL-MCNC: 90 MG/DL
HBA1C MFR BLD HPLC: 5.4 %
HDLC SERPL-MCNC: 60 MG/DL
LDLC SERPL CALC-MCNC: 117 MG/DL
NONHDLC SERPL-MCNC: 132 MG/DL
POTASSIUM SERPL-SCNC: 4.4 MMOL/L
PROT SERPL-MCNC: 7.3 G/DL
SODIUM SERPL-SCNC: 144 MMOL/L
TRIGL SERPL-MCNC: 78 MG/DL
TSH SERPL-ACNC: 1.6 UIU/ML

## 2023-06-15 ENCOUNTER — APPOINTMENT (OUTPATIENT)
Dept: HEART AND VASCULAR | Facility: CLINIC | Age: 58
End: 2023-06-15
Payer: COMMERCIAL

## 2023-06-15 ENCOUNTER — NON-APPOINTMENT (OUTPATIENT)
Age: 58
End: 2023-06-15

## 2023-06-15 DIAGNOSIS — E55.9 VITAMIN D DEFICIENCY, UNSPECIFIED: ICD-10-CM

## 2023-06-15 PROCEDURE — 99443: CPT

## 2023-09-11 ENCOUNTER — APPOINTMENT (OUTPATIENT)
Dept: HEART AND VASCULAR | Facility: CLINIC | Age: 58
End: 2023-09-11
Payer: COMMERCIAL

## 2023-09-11 VITALS
WEIGHT: 184 LBS | OXYGEN SATURATION: 96 % | SYSTOLIC BLOOD PRESSURE: 140 MMHG | DIASTOLIC BLOOD PRESSURE: 98 MMHG | TEMPERATURE: 96.6 F | BODY MASS INDEX: 23.61 KG/M2 | HEIGHT: 74 IN | HEART RATE: 63 BPM

## 2023-09-11 DIAGNOSIS — F41.9 ANXIETY DISORDER, UNSPECIFIED: ICD-10-CM

## 2023-09-11 DIAGNOSIS — R03.0 ELEVATED BLOOD-PRESSURE READING, W/OUT DIAGNOSIS OF HYPERTENSION: ICD-10-CM

## 2023-09-11 DIAGNOSIS — M48.02 SPINAL STENOSIS, CERVICAL REGION: ICD-10-CM

## 2023-09-11 PROCEDURE — 93000 ELECTROCARDIOGRAM COMPLETE: CPT

## 2023-09-11 PROCEDURE — 99214 OFFICE O/P EST MOD 30 MIN: CPT | Mod: 25

## 2023-11-13 ENCOUNTER — APPOINTMENT (OUTPATIENT)
Dept: HEART AND VASCULAR | Facility: CLINIC | Age: 58
End: 2023-11-13

## 2024-01-23 ENCOUNTER — APPOINTMENT (OUTPATIENT)
Dept: HEART AND VASCULAR | Facility: CLINIC | Age: 59
End: 2024-01-23
Payer: COMMERCIAL

## 2024-01-23 VITALS
HEIGHT: 74 IN | BODY MASS INDEX: 24 KG/M2 | OXYGEN SATURATION: 58 % | WEIGHT: 187 LBS | DIASTOLIC BLOOD PRESSURE: 90 MMHG | SYSTOLIC BLOOD PRESSURE: 162 MMHG | HEART RATE: 58 BPM

## 2024-01-23 VITALS — DIASTOLIC BLOOD PRESSURE: 97 MMHG | SYSTOLIC BLOOD PRESSURE: 163 MMHG

## 2024-01-23 DIAGNOSIS — R00.1 BRADYCARDIA, UNSPECIFIED: ICD-10-CM

## 2024-01-23 DIAGNOSIS — E78.5 HYPERLIPIDEMIA, UNSPECIFIED: ICD-10-CM

## 2024-01-23 DIAGNOSIS — R74.8 ABNORMAL LEVELS OF OTHER SERUM ENZYMES: ICD-10-CM

## 2024-01-23 DIAGNOSIS — R00.2 PALPITATIONS: ICD-10-CM

## 2024-01-23 DIAGNOSIS — R07.89 OTHER CHEST PAIN: ICD-10-CM

## 2024-01-23 DIAGNOSIS — I10 ESSENTIAL (PRIMARY) HYPERTENSION: ICD-10-CM

## 2024-01-23 PROCEDURE — 93000 ELECTROCARDIOGRAM COMPLETE: CPT

## 2024-01-23 PROCEDURE — 99214 OFFICE O/P EST MOD 30 MIN: CPT | Mod: 25

## 2024-01-23 PROCEDURE — 93306 TTE W/DOPPLER COMPLETE: CPT

## 2024-01-24 LAB
ALBUMIN SERPL ELPH-MCNC: 4.5 G/DL
ALP BLD-CCNC: 70 U/L
ALT SERPL-CCNC: 20 U/L
ANION GAP SERPL CALC-SCNC: 10 MMOL/L
AST SERPL-CCNC: 29 U/L
BILIRUB SERPL-MCNC: 0.6 MG/DL
BUN SERPL-MCNC: 5 MG/DL
CALCIUM SERPL-MCNC: 9 MG/DL
CHLORIDE SERPL-SCNC: 104 MMOL/L
CHOLEST SERPL-MCNC: 166 MG/DL
CO2 SERPL-SCNC: 29 MMOL/L
CREAT SERPL-MCNC: 1.06 MG/DL
EGFR: 82 ML/MIN/1.73M2
ESTIMATED AVERAGE GLUCOSE: 100 MG/DL
GLUCOSE SERPL-MCNC: 89 MG/DL
HBA1C MFR BLD HPLC: 5.1 %
HCT VFR BLD CALC: 42.4 %
HDLC SERPL-MCNC: 46 MG/DL
HGB BLD-MCNC: 13.9 G/DL
LDLC SERPL CALC-MCNC: 98 MG/DL
MCHC RBC-ENTMCNC: 29.9 PG
MCHC RBC-ENTMCNC: 32.8 GM/DL
MCV RBC AUTO: 91.2 FL
NONHDLC SERPL-MCNC: 119 MG/DL
PLATELET # BLD AUTO: 126 K/UL
POTASSIUM SERPL-SCNC: 4.8 MMOL/L
PROT SERPL-MCNC: 6.9 G/DL
RBC # BLD: 4.65 M/UL
RBC # FLD: 13.4 %
SODIUM SERPL-SCNC: 144 MMOL/L
TRIGL SERPL-MCNC: 117 MG/DL
TSH SERPL-ACNC: 2.39 UIU/ML
WBC # FLD AUTO: 5.27 K/UL

## 2024-02-18 RX ORDER — LOSARTAN POTASSIUM 100 MG/1
100 TABLET, FILM COATED ORAL DAILY
Qty: 90 | Refills: 3 | Status: ACTIVE | COMMUNITY
Start: 2023-06-21 | End: 1900-01-01

## 2024-02-18 RX ORDER — LOSARTAN POTASSIUM 50 MG/1
50 TABLET, FILM COATED ORAL DAILY
Qty: 90 | Refills: 1 | Status: DISCONTINUED | COMMUNITY
Start: 2022-08-30 | End: 2024-02-18

## 2024-05-28 ENCOUNTER — APPOINTMENT (OUTPATIENT)
Dept: HEART AND VASCULAR | Facility: CLINIC | Age: 59
End: 2024-05-28
Payer: COMMERCIAL

## 2024-05-28 VITALS — SYSTOLIC BLOOD PRESSURE: 158 MMHG | HEART RATE: 61 BPM | DIASTOLIC BLOOD PRESSURE: 90 MMHG

## 2024-05-28 DIAGNOSIS — I83.891 VARICOSE VEINS OF RIGHT LOWER EXTREMITY WITH OTHER COMPLICATIONS: ICD-10-CM

## 2024-05-28 DIAGNOSIS — R60.0 LOCALIZED EDEMA: ICD-10-CM

## 2024-05-28 PROCEDURE — 93970 EXTREMITY STUDY: CPT

## 2024-05-30 PROBLEM — R60.0 LEG EDEMA: Status: ACTIVE | Noted: 2022-08-30

## 2024-07-23 ENCOUNTER — APPOINTMENT (OUTPATIENT)
Dept: INTERNAL MEDICINE | Facility: CLINIC | Age: 59
End: 2024-07-23
Payer: COMMERCIAL

## 2024-07-23 VITALS
WEIGHT: 184 LBS | DIASTOLIC BLOOD PRESSURE: 90 MMHG | HEIGHT: 74 IN | RESPIRATION RATE: 16 BRPM | TEMPERATURE: 97.7 F | HEART RATE: 74 BPM | BODY MASS INDEX: 23.61 KG/M2 | OXYGEN SATURATION: 100 % | SYSTOLIC BLOOD PRESSURE: 179 MMHG

## 2024-07-23 DIAGNOSIS — R07.89 OTHER CHEST PAIN: ICD-10-CM

## 2024-07-23 DIAGNOSIS — E78.5 HYPERLIPIDEMIA, UNSPECIFIED: ICD-10-CM

## 2024-07-23 DIAGNOSIS — I10 ESSENTIAL (PRIMARY) HYPERTENSION: ICD-10-CM

## 2024-07-23 DIAGNOSIS — E55.9 VITAMIN D DEFICIENCY, UNSPECIFIED: ICD-10-CM

## 2024-07-23 DIAGNOSIS — R39.9 UNSPECIFIED SYMPTOMS AND SIGNS INVOLVING THE GENITOURINARY SYSTEM: ICD-10-CM

## 2024-07-23 DIAGNOSIS — Z87.898 PERSONAL HISTORY OF OTHER SPECIFIED CONDITIONS: ICD-10-CM

## 2024-07-23 DIAGNOSIS — R74.8 ABNORMAL LEVELS OF OTHER SERUM ENZYMES: ICD-10-CM

## 2024-07-23 DIAGNOSIS — R03.0 ELEVATED BLOOD-PRESSURE READING, W/OUT DIAGNOSIS OF HYPERTENSION: ICD-10-CM

## 2024-07-23 DIAGNOSIS — Z00.00 ENCOUNTER FOR GENERAL ADULT MEDICAL EXAMINATION W/OUT ABNORMAL FINDINGS: ICD-10-CM

## 2024-07-23 PROCEDURE — 36415 COLL VENOUS BLD VENIPUNCTURE: CPT

## 2024-07-23 PROCEDURE — 99396 PREV VISIT EST AGE 40-64: CPT | Mod: 25

## 2024-07-23 NOTE — PHYSICAL EXAM
[No Acute Distress] : no acute distress [Well Nourished] : well nourished [PERRL] : pupils equal round and reactive to light [Normal TMs] : both tympanic membranes were normal [No Lymphadenopathy] : no lymphadenopathy [Clear to Auscultation] : lungs were clear to auscultation bilaterally [Regular Rhythm] : with a regular rhythm [Soft] : abdomen soft [Coordination Grossly Intact] : coordination grossly intact [Normal Gait] : normal gait [Speech Grossly Normal] : speech grossly normal [Normal Mood] : the mood was normal

## 2024-07-23 NOTE — HISTORY OF PRESENT ILLNESS
[de-identified] : Pt had some vague chest symptoms and had a full work up with cardio.  Tests came at well.  No nausea, vomiting, diarrhea, and constipation. No chest pain or shortness of breath.  Exercises regularly.

## 2024-07-23 NOTE — HEALTH RISK ASSESSMENT
[Very Good] : ~his/her~  mood as very good [No] : No [0] : 2) Feeling down, depressed, or hopeless: Not at all (0) [PHQ-2 Negative - No further assessment needed] : PHQ-2 Negative - No further assessment needed [Never] : Never [Patient reported colonoscopy was normal] : Patient reported colonoscopy was normal [None] : None [[id number]] : 0 [ColonoscopyDate] : 01/22

## 2024-07-30 ENCOUNTER — NON-APPOINTMENT (OUTPATIENT)
Age: 59
End: 2024-07-30

## 2024-07-30 DIAGNOSIS — R79.89 OTHER SPECIFIED ABNORMAL FINDINGS OF BLOOD CHEMISTRY: ICD-10-CM

## 2024-07-30 LAB
25(OH)D3 SERPL-MCNC: 32.9 NG/ML
ALBUMIN SERPL ELPH-MCNC: 4.7 G/DL
ALP BLD-CCNC: 59 U/L
ALT SERPL-CCNC: 21 U/L
ANION GAP SERPL CALC-SCNC: 11 MMOL/L
AST SERPL-CCNC: 28 U/L
BASOPHILS # BLD AUTO: 0.02 K/UL
BASOPHILS NFR BLD AUTO: 0.4 %
BILIRUB SERPL-MCNC: 1 MG/DL
BUN SERPL-MCNC: 15 MG/DL
CALCIUM SERPL-MCNC: 9.5 MG/DL
CHLORIDE SERPL-SCNC: 104 MMOL/L
CHOLEST SERPL-MCNC: 198 MG/DL
CO2 SERPL-SCNC: 29 MMOL/L
CREAT SERPL-MCNC: 1.31 MG/DL
EGFR: 63 ML/MIN/1.73M2
EOSINOPHIL # BLD AUTO: 0.2 K/UL
EOSINOPHIL NFR BLD AUTO: 4.3 %
FOLATE SERPL-MCNC: 16.9 NG/ML
GLUCOSE SERPL-MCNC: 81 MG/DL
HCT VFR BLD CALC: 41.9 %
HDLC SERPL-MCNC: 58 MG/DL
HGB BLD-MCNC: 14 G/DL
IMM GRANULOCYTES NFR BLD AUTO: 0.2 %
LDLC SERPL CALC-MCNC: 121 MG/DL
LYMPHOCYTES # BLD AUTO: 1.35 K/UL
LYMPHOCYTES NFR BLD AUTO: 29.2 %
MAN DIFF?: NORMAL
MCHC RBC-ENTMCNC: 30.2 PG
MCHC RBC-ENTMCNC: 33.4 GM/DL
MCV RBC AUTO: 90.3 FL
MONOCYTES # BLD AUTO: 0.5 K/UL
MONOCYTES NFR BLD AUTO: 10.8 %
NEUTROPHILS # BLD AUTO: 2.54 K/UL
NEUTROPHILS NFR BLD AUTO: 55.1 %
NONHDLC SERPL-MCNC: 140 MG/DL
PLATELET # BLD AUTO: 139 K/UL
POTASSIUM SERPL-SCNC: 4.2 MMOL/L
PROT SERPL-MCNC: 7.1 G/DL
PSA SERPL-MCNC: 0.32 NG/ML
RBC # BLD: 4.64 M/UL
RBC # FLD: 13.8 %
SODIUM SERPL-SCNC: 143 MMOL/L
TRIGL SERPL-MCNC: 107 MG/DL
TSH SERPL-ACNC: 1.85 UIU/ML
VIT B12 SERPL-MCNC: 315 PG/ML
WBC # FLD AUTO: 4.62 K/UL

## 2025-01-30 DIAGNOSIS — R79.89 OTHER SPECIFIED ABNORMAL FINDINGS OF BLOOD CHEMISTRY: ICD-10-CM

## 2025-01-30 DIAGNOSIS — I10 ESSENTIAL (PRIMARY) HYPERTENSION: ICD-10-CM

## 2025-03-10 ENCOUNTER — NON-APPOINTMENT (OUTPATIENT)
Age: 60
End: 2025-03-10